# Patient Record
Sex: FEMALE | Race: AMERICAN INDIAN OR ALASKA NATIVE
[De-identification: names, ages, dates, MRNs, and addresses within clinical notes are randomized per-mention and may not be internally consistent; named-entity substitution may affect disease eponyms.]

---

## 2018-06-16 ENCOUNTER — HOSPITAL ENCOUNTER (EMERGENCY)
Dept: HOSPITAL 5 - ED | Age: 18
Discharge: LEFT BEFORE DISCHARGE | End: 2018-06-16
Payer: COMMERCIAL

## 2018-06-16 DIAGNOSIS — Z53.21: ICD-10-CM

## 2018-06-16 DIAGNOSIS — R10.9: Primary | ICD-10-CM

## 2020-02-07 ENCOUNTER — HOSPITAL ENCOUNTER (OUTPATIENT)
Dept: HOSPITAL 5 - TRG | Age: 20
Discharge: HOME | End: 2020-02-07
Attending: OBSTETRICS & GYNECOLOGY
Payer: COMMERCIAL

## 2020-02-07 VITALS — SYSTOLIC BLOOD PRESSURE: 122 MMHG | DIASTOLIC BLOOD PRESSURE: 68 MMHG

## 2020-02-07 DIAGNOSIS — R52: ICD-10-CM
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
## 2020-02-07 DIAGNOSIS — O26.893: ICD-10-CM
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
## 2020-02-07 DIAGNOSIS — J45.909: ICD-10-CM

## 2020-02-07 DIAGNOSIS — O99.513: ICD-10-CM
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
## 2020-02-07 DIAGNOSIS — Z3A.35: ICD-10-CM

## 2020-02-07 LAB
BENZODIAZEPINES SCREEN,URINE: (no result)
BILIRUB UR QL STRIP: (no result)
BLOOD UR QL VISUAL: (no result)
METHADONE SCREEN,URINE: (no result)
MUCOUS THREADS #/AREA URNS HPF: (no result) /HPF
OPIATE SCREEN,URINE: (no result)
PH UR STRIP: 6 [PH] (ref 5–7)
PROT UR STRIP-MCNC: (no result) MG/DL
RBC #/AREA URNS HPF: 3 /HPF (ref 0–6)
UROBILINOGEN UR-MCNC: < 2 MG/DL (ref ?–2)
WBC #/AREA URNS HPF: 2 /HPF (ref 0–6)

## 2020-02-07 PROCEDURE — 81001 URINALYSIS AUTO W/SCOPE: CPT

## 2020-02-07 PROCEDURE — 59025 FETAL NON-STRESS TEST: CPT

## 2020-02-07 PROCEDURE — 80307 DRUG TEST PRSMV CHEM ANLYZR: CPT

## 2020-02-07 PROCEDURE — 76819 FETAL BIOPHYS PROFIL W/O NST: CPT

## 2020-02-07 PROCEDURE — 96361 HYDRATE IV INFUSION ADD-ON: CPT

## 2020-02-07 PROCEDURE — 96372 THER/PROPH/DIAG INJ SC/IM: CPT

## 2020-02-07 PROCEDURE — 76815 OB US LIMITED FETUS(S): CPT

## 2020-02-07 PROCEDURE — 96360 HYDRATION IV INFUSION INIT: CPT

## 2020-02-07 NOTE — ULTRASOUND REPORT
US OB limited, US OB fetal BPP wo non-stress



INDICATION / CLINICAL INFORMATION:

f/u fall.



COMPARISON:

None available.



FINDINGS:

Viable single intrauterine gestation in the cephalic presentation with fetal heart rate 138 bpm.

KAREEN is 13.

The placenta is anterior, grade 2 with no demonstrated placental hemorrhage.



Biophysical profile scored 8/8.



IMPRESSION:

1. Viable single intrauterine gestation with no evidence of placental hemorrhage. 



Signer Name: Aren Ayoub MD 

Signed: 2/7/2020 7:22 PM

 Workstation Name: [id number]

## 2020-02-07 NOTE — ULTRASOUND REPORT
US OB limited, US OB fetal BPP wo non-stress



INDICATION / CLINICAL INFORMATION:

f/u fall.



COMPARISON:

None available.



FINDINGS:

Viable single intrauterine gestation in the cephalic presentation with fetal heart rate 138 bpm.

KAREEN is 13.

The placenta is anterior, grade 2 with no demonstrated placental hemorrhage.



Biophysical profile scored 8/8.



IMPRESSION:

1. Viable single intrauterine gestation with no evidence of placental hemorrhage. 



Signer Name: Aren Ayoub MD 

Signed: 2/7/2020 7:22 PM

 Workstation Name: [id number]

## 2020-02-08 NOTE — EVENT NOTE
Date: 20


Triage note for 2020:


Patient came in to L&D after falling down a few carpeted stairs at her home. She

is 35 weeks, 5 days gestation. Patient of [facility]. States her 

EDC is 3/8/2020. No prenatal records available. Pt. states she has not had any 

problems during this pregnancy; she states she has no health problems. She 

states she does not take any medications other than prenatal vitamins. Patient 

denies any abuse. States she tripped and fell onto her back/buttocks on carpeted

stairs. Did not hit her abdomen. Patient denies vaginal bleeding or leaking of 

fluid. Patient reports active fetal movement. Patient denies abdominal pain. She

reports occasional mild contractions. She states she has had [place] 

contractions for several weeks intermittently and she states they are no more 

frequent since she fell. Patient was monitored with EFM for 6 hours; category 1 

fetal heart rate tracing. No decelerations noted. Irregular mild contractions 

which resolved with IV hydration and one dose of terbutaline. Cervix closed, 

thick, and posterior. Ultrasound showed no signs of placental hemorrhage. BPP 

8/8 and KAREEN 13 cm. Patient was discharged from L&D to the ED to be evaluated by 

ER doctor (she states she hit her head when she fell down the stairs). Discussed

with patient daily fetal movement counting, warning signs, signs of  

labor. Advised patient to follow up at [facility] this coming 

week. Patient voiced understanding of all instructions. Nurse to take patient to

ED for further evaluation.

## 2020-02-28 ENCOUNTER — HOSPITAL ENCOUNTER (INPATIENT)
Dept: HOSPITAL 5 - TRG | Age: 20
LOS: 2 days | Discharge: HOME | End: 2020-03-01
Attending: OBSTETRICS & GYNECOLOGY | Admitting: OBSTETRICS & GYNECOLOGY
Payer: MEDICAID

## 2020-02-28 DIAGNOSIS — Z23: ICD-10-CM

## 2020-02-28 DIAGNOSIS — D62: ICD-10-CM

## 2020-02-28 DIAGNOSIS — Z3A.38: ICD-10-CM

## 2020-02-28 DIAGNOSIS — Z79.899: ICD-10-CM

## 2020-02-28 DIAGNOSIS — J45.909: ICD-10-CM

## 2020-02-28 LAB
HCT VFR BLD CALC: 32.7 % (ref 30.3–42.9)
HGB BLD-MCNC: 10.9 GM/DL (ref 10.1–14.3)
MCHC RBC AUTO-ENTMCNC: 33 % (ref 30–34)
MCV RBC AUTO: 78 FL (ref 79–97)
PLATELET # BLD: 242 K/MM3 (ref 140–440)
RBC # BLD AUTO: 4.22 M/MM3 (ref 3.65–5.03)

## 2020-02-28 PROCEDURE — 86850 RBC ANTIBODY SCREEN: CPT

## 2020-02-28 PROCEDURE — A6250 SKIN SEAL PROTECT MOISTURIZR: HCPCS

## 2020-02-28 PROCEDURE — 85014 HEMATOCRIT: CPT

## 2020-02-28 PROCEDURE — 85018 HEMOGLOBIN: CPT

## 2020-02-28 PROCEDURE — 85027 COMPLETE CBC AUTOMATED: CPT

## 2020-02-28 PROCEDURE — 96372 THER/PROPH/DIAG INJ SC/IM: CPT

## 2020-02-28 PROCEDURE — 86900 BLOOD TYPING SEROLOGIC ABO: CPT

## 2020-02-28 PROCEDURE — 36415 COLL VENOUS BLD VENIPUNCTURE: CPT

## 2020-02-28 PROCEDURE — 86901 BLOOD TYPING SEROLOGIC RH(D): CPT

## 2020-02-28 RX ADMIN — SODIUM CHLORIDE, SODIUM LACTATE, POTASSIUM CHLORIDE, AND CALCIUM CHLORIDE SCH MLS/HR: .6; .31; .03; .02 INJECTION, SOLUTION INTRAVENOUS at 19:21

## 2020-02-28 RX ADMIN — SODIUM CHLORIDE, SODIUM LACTATE, POTASSIUM CHLORIDE, AND CALCIUM CHLORIDE SCH MLS/HR: .6; .31; .03; .02 INJECTION, SOLUTION INTRAVENOUS at 17:23

## 2020-02-28 NOTE — ANESTHESIA CONSULTATION
Anesthesia Consult and Med Hx


Date of service: 02/28/20





- Airway


Anesthetic Teeth Evaluation: Good


ROM Head & Neck: Adequate


Mental/Hyoid Distance: Adequate


Mallampati Class: Class II


Intubation Access Assessment: Good





- Pulmonary Exam


CTA: Yes





- Cardiac Exam


Cardiac Exam: RRR





- Pre-Operative Health Status


ASA Pre-Surgery Classification: ASA2, Emergency


Proposed Anesthetic Plan: Epidural





- Pulmonary


Hx Asthma: Yes (2017)





- Cardiovascular System


Hx Hypertension: No





- Central Nervous System


Hx Seizures: No


Hx Psychiatric Problems: No





- Endocrine


Hx Renal Disease: No


Hx Hypothyroidism: No


Hx Hyperthyroidism: No





- Hematic


Hx Anemia: No


Hx Sickle Cell Disease: No





- Other Systems


Hx Alcohol Use: No

## 2020-02-28 NOTE — PROGRESS NOTE
Labor Epidural





- Labor Epidural


Start Time: 19:05


Stop Time: 19:10


Performed by:: PATRICIA ROBLEDO


Procedure: 


Patient is requesting a laboring epidural for laboring pain.  Patient IDed, H&P 

reviewed, all questions and concerns were answered, and consent was signed. 

Timeout was performed at bedside.  Patient in sitting position.  Sterile prep 

and drape was performed.  [3] ml of 1% lidocaine skin wheal at L[3]- L [4].  18-

gauge Touhy epidural needle was advanced to loss of resistance with air 

technique.  Negative CSF negative blood.  Epidural catheter advanced to [15] 

centimeters.  [-] Aspiration [-] test dose.  Sterile dressing applied.  Patient 

tolerated procedure.

## 2020-02-28 NOTE — HISTORY AND PHYSICAL REPORT
From: Marquise Macias  To: Jl Cruz MD  Sent: 7/30/2018 3:02 PM CDT  Subject: GABAPENTIN    Dr. Cruz,  Letting you know that I don't care to continue taking the GABAPENTIN medicine as I do not notice that great of a difference, in fact vary little. Ok if I just stop taking it?  Noah Macias     History of Present Illness


Date of examination: 20


Chief complaint: 





Labor


History of present illness: 





Pt is a 20yo BF  EDC 3/7/20;  EGA 38 6/7 weeks presents to L&D complaining 

of RUC's q 3-5 mins.  She received prenatal care at [facility] 

since 9 weeks and  course unremarkable except for + Chlamydia - treated

during pregnancy.  Prenatal records are available and GBS is Positive.





Past History


Past Medical History: asthma


Past Surgical History: no surgical history


Family/Genetic History: none


Social history: no significant social history, single





- Obstetrical History


Expected Date of Delivery: 20


Actual Gestation: 39 Week(s) 0 Day(s) 


: 1





Medications and Allergies


                                    Allergies











Allergy/AdvReac Type Severity Reaction Status Date / Time


 


No Known Allergies Allergy   Verified 14 21:10











                                Home Medications











 Medication  Instructions  Recorded  Confirmed  Last Taken  Type


 


Amoxicillin/K Clav Tab [Augmentin 1 tab PO BID #14 tab 16  Unknown Rx





875 mg]     


 


Ibuprofen [Motrin 600 MG tab] 600 mg PO Q8H PRN #20 tablet 16  Unknown Rx














Review of Systems


All systems: negative





- Vital Signs


Vital signs: 


                                   Vital Signs











Pulse BP


 


 99 H  151/93 


 


 20 12:36  20 12:36








                                        











Temp Pulse Resp BP Pulse Ox


 


 97.5 F L  82      123/83    


 


 20 12:46  20 13:40     20 13:40   














- Physical Exam


Breasts: Positive: deferred


Abdomen: Positive: normal appearance


Genitourinary (Female): Positive: normal external genitalia


Uterus: Positive: enlarged


Extremities: Positive: normal





- Obstetrical


FHR: category 1


Uterine Contraction Monitor Mode: External


Cervical Dilatation: 4.5 (per nurse)


Cervical Effacement Percentage: 70 (per nurse)


Fetal station: -1


Uterine Contraction Pattern: Regular


Uterine Tone Measurement Phase: Contraction


Uterine Contraction Intensity: Moderate





Results


Result Diagrams: 


                                 20 17:00





All other labs normal.








Assessment and Plan





- Patient Problems


(1) 38 weeks gestation of pregnancy


Onset Date: 20   Current Visit: Yes   Status: Acute   


Plan to address problem: 


A:  IUP @ 38 6/7 weeks in labor


      +GBS





 P:  Admit to L&D for expectant vaginal delivery


      IV Ampicillin

## 2020-02-29 LAB
HCT VFR BLD CALC: 25.9 % (ref 30.3–42.9)
HGB BLD-MCNC: 8.5 GM/DL (ref 10.1–14.3)

## 2020-02-29 PROCEDURE — 3E0R3BZ INTRODUCTION OF ANESTHETIC AGENT INTO SPINAL CANAL, PERCUTANEOUS APPROACH: ICD-10-PCS | Performed by: OBSTETRICS & GYNECOLOGY

## 2020-02-29 PROCEDURE — 00HU33Z INSERTION OF INFUSION DEVICE INTO SPINAL CANAL, PERCUTANEOUS APPROACH: ICD-10-PCS | Performed by: OBSTETRICS & GYNECOLOGY

## 2020-02-29 PROCEDURE — 0KQM0ZZ REPAIR PERINEUM MUSCLE, OPEN APPROACH: ICD-10-PCS | Performed by: OBSTETRICS & GYNECOLOGY

## 2020-02-29 RX ADMIN — SENNOSIDES AND DOCUSATE SODIUM SCH: 50; 8.6 TABLET ORAL at 17:09

## 2020-02-29 RX ADMIN — IBUPROFEN SCH: 600 TABLET, FILM COATED ORAL at 17:08

## 2020-02-29 RX ADMIN — IBUPROFEN SCH MG: 600 TABLET, FILM COATED ORAL at 02:20

## 2020-02-29 RX ADMIN — SENNOSIDES AND DOCUSATE SODIUM SCH TAB: 50; 8.6 TABLET ORAL at 10:41

## 2020-02-29 RX ADMIN — IBUPROFEN SCH MG: 600 TABLET, FILM COATED ORAL at 17:08

## 2020-02-29 RX ADMIN — IBUPROFEN SCH MG: 600 TABLET, FILM COATED ORAL at 12:20

## 2020-02-29 RX ADMIN — Medication SCH EACH: at 10:41

## 2020-02-29 RX ADMIN — DOCUSATE SODIUM SCH MG: 100 CAPSULE, LIQUID FILLED ORAL at 10:41

## 2020-02-29 RX ADMIN — FERROUS SULFATE TAB 325 MG (65 MG ELEMENTAL FE) SCH MG: 325 (65 FE) TAB at 10:41

## 2020-02-29 NOTE — PROCEDURE NOTE
OB Delivery Note





- Delivery


Date of Delivery: 20


Surgeon: BEATRICE RAMIREZ


Estimated blood loss: 300cc





- Vaginal


Delivery presentation: vertex


Delivery position: OA


Intrapartum events: mult.variable deceleratio


Delivery induction: none


Delivery augmentation: rupture of membranes, pitocin


Delivery monitor: external FHT, external uterine


Route of delivery: vacuum extraction


Indicators for instrumentation: nonreassuring FHR tracing


Delivery placenta: spontaneous


Delivery cord: nuchal cord, 3 umbilical vessels


Episiotomy: none


Delivery laceration: 2nd degree (perineal)


Delivery repair: vicryl


Anesthesia: epidural


Delivery comments: 





Infant delivered OA with the aid of a vacuum, 3 pulls, 1 pop-off, and infant 

placed on Mom's chest for skin-to-skin bonding and delayed cord clamping, cut by

Dad





- Infant


  ** A


Apgar at 1 minute: 8


Apgar at 5 minutes: 9


Infant Gender: Male (3650gms)

## 2020-02-29 NOTE — POST ANESTHESIA EVALUATION
- Post Anesthesia Evaluation


Patient Participated: Yes


Airway Patent: Yes


Stable Respiratory Function: Yes


Temp > 96.8F: Yes


Pain Manageable: Yes


Adequeate Hydration: Yes


Anesthesia Complications: No


Block Receding Appropriately: Yes


Patient on Ventilator: No

## 2020-03-01 VITALS — DIASTOLIC BLOOD PRESSURE: 86 MMHG | SYSTOLIC BLOOD PRESSURE: 127 MMHG

## 2020-03-01 PROCEDURE — 3E0234Z INTRODUCTION OF SERUM, TOXOID AND VACCINE INTO MUSCLE, PERCUTANEOUS APPROACH: ICD-10-PCS | Performed by: OBSTETRICS & GYNECOLOGY

## 2020-03-01 PROCEDURE — 3E0134Z INTRODUCTION OF SERUM, TOXOID AND VACCINE INTO SUBCUTANEOUS TISSUE, PERCUTANEOUS APPROACH: ICD-10-PCS | Performed by: OBSTETRICS & GYNECOLOGY

## 2020-03-01 RX ADMIN — Medication SCH EACH: at 10:30

## 2020-03-01 RX ADMIN — FERROUS SULFATE TAB 325 MG (65 MG ELEMENTAL FE) SCH: 325 (65 FE) TAB at 00:18

## 2020-03-01 RX ADMIN — IBUPROFEN SCH MG: 600 TABLET, FILM COATED ORAL at 11:47

## 2020-03-01 RX ADMIN — FERROUS SULFATE TAB 325 MG (65 MG ELEMENTAL FE) SCH MG: 325 (65 FE) TAB at 10:30

## 2020-03-01 RX ADMIN — SENNOSIDES AND DOCUSATE SODIUM SCH: 50; 8.6 TABLET ORAL at 00:18

## 2020-03-01 RX ADMIN — DOCUSATE SODIUM SCH MG: 100 CAPSULE, LIQUID FILLED ORAL at 10:30

## 2020-03-01 RX ADMIN — SENNOSIDES AND DOCUSATE SODIUM SCH: 50; 8.6 TABLET ORAL at 10:30

## 2020-03-01 RX ADMIN — IBUPROFEN SCH: 600 TABLET, FILM COATED ORAL at 00:18

## 2020-03-01 NOTE — DISCHARGE SUMMARY
Providers





- Providers


Date of Admission: 


20 12:28





Date of discharge: 20


Attending physician: 


BEATRICE RAMIREZ





Primary care physician: 


BEATRICE RAMIREZ








Hospitalization


Reason for admission: active labor, IUP at term


Delivery: 


Episiotomy: none


Laceration: 2nd degree (perineal)


Incision: normal


Other postpartum procedures: none


Postpartum complications: none


Discharge diagnosis: IUP at term delivered


[place] baby: male


Hospital course: 





Unremarkable.


Condition at discharge: Good


Disposition: DC-01 TO HOME OR SELFCARE





- Discharge Diagnoses


(1) 38 weeks gestation of pregnancy


Status: Resolved   





(2)  (normal spontaneous vaginal delivery)


Status: Resolved   





(3) Acute blood loss anemia


Status: Resolved   





Plan





- Discharge Medications


Prescriptions: 


Benzocaine/Menthol [Dermoplast] 1 spray TP PRN PRN #1 can


 PRN Reason: Episiotomy Pain


Ferrous Sulfate [Feosol 325 MG tab] 325 mg PO BID #60 tablet


Ibuprofen [Motrin 600 MG tab] 600 mg PO Q6HR #30 tablet


Prenatal Vit-Fe Fumar-FA [Prenatal Vitamin] 1 each PO QDAY #30 tablet





- Provider Discharge Summary


Activity: routine, no sex for 6 weeks, no heavy lifting 4 weeks, no strenuous 

exercise


Diet: routine


Instructions: routine


Additional instructions: 


[]  Smoking cessation referral if applicable(refer to patient education folder 

for contact #)


[]  Refer to [facility] Booklet








Call your doctor immediately for:


* Fever > 100.5


* Heavy vaginal bleeding ( >1 pad per hour)


* Severe persistent headache


* Shortness of breath


* Reddened, hot, painful area to leg or breast


* Drainage or odor from incision.





* Keep incision clean and dry at all times and follow doctor's instructions 

regarding bathing/showering











- Follow up plan


Follow up: 


BEATRICE RAMIREZ MD [Primary Care Provider] - 6 Weeks


PATIENCE FOURNIER CNM [Advanced Practice Nurse] - 6 Weeks

## 2020-03-01 NOTE — PROGRESS NOTE
Assessment and Plan





- Patient Problems


(1) 38 weeks gestation of pregnancy


Onset Date: 20   Current Visit: Yes   Status: Resolved   





(2)  (normal spontaneous vaginal delivery)


Onset Date: 20   Current Visit: Yes   Status: Resolved   


Plan to address problem: 


A:  S/P  - PPD #1  Doing well


      Asymptomatic anemia - stable





 P:  May go home today.








(3) Acute blood loss anemia


Onset Date: 20   Current Visit: Yes   Status: Resolved   





Subjective





- Subjective


Date of service: 20


Principal diagnosis: s/p  - PPD #1


Interval history: 





Pt is feeling well without complaints.  Bleeding improved.


Patient reports: appetite normal, voiding normally, pain well controlled, 

flatus, ambulating normally, no dizzy ambulation, no nauseated


[place]: doing well, nursing well, bottle feeding





Objective





- Vital Signs


Latest vital signs: 


                                   Vital Signs











  Temp Pulse Resp BP BP Pulse Ox


 


 20 08:04  98.2 F  94 H  18  129/88   100


 


 20 00:30  98.6 F  69  18   101/78 


 


 20 16:56  97.9 F  80  18  118/80   99


 


 20 12:48  97.9 F  78  20   111/66 








                                Intake and Output











 20





 22:59 06:59 14:59


 


Intake Total 300  


 


Balance 300  


 


Intake:   


 


  Intake, Free Water 300  


 


Other:   


 


  # Voids   


 


    Void 1 1 














- Exam


Breasts: Present: deferred


Abdomen: Present: normal appearance, soft


Uterus: Present: normal, firm, fundal height below umbilicus


Extremities: Present: normal





- Labs


Labs: 


                              Abnormal lab results











  20 Range/Units





  15:25 


 


Hgb  8.5 L  (10.1-14.3)  gm/dl


 


Hct  25.9 L D  (30.3-42.9)  %








                                Laboratory Tests











  20





  17:00 17:00 15:25


 


WBC  9.8  


 


RBC  4.22  


 


Hgb  10.9   8.5 L


 


Hct  32.7   25.9 L D


 


MCV  78 L  


 


MCH  26 L  


 


MCHC  33  


 


RDW  15.3 H  


 


Plt Count  242  


 


Blood Type   A POSITIVE 


 


Antibody Screen   Negative